# Patient Record
Sex: FEMALE | Race: WHITE | Employment: FULL TIME | URBAN - METROPOLITAN AREA
[De-identification: names, ages, dates, MRNs, and addresses within clinical notes are randomized per-mention and may not be internally consistent; named-entity substitution may affect disease eponyms.]

---

## 2019-03-24 ENCOUNTER — HOSPITAL ENCOUNTER (EMERGENCY)
Age: 49
Discharge: HOME | End: 2019-03-24
Payer: MEDICAID

## 2019-03-24 VITALS
OXYGEN SATURATION: 98 % | HEIGHT: 65 IN | WEIGHT: 203 LBS | RESPIRATION RATE: 16 BRPM | DIASTOLIC BLOOD PRESSURE: 66 MMHG | TEMPERATURE: 98 F | HEART RATE: 70 BPM | SYSTOLIC BLOOD PRESSURE: 120 MMHG | BODY MASS INDEX: 33.82 KG/M2

## 2019-03-24 DIAGNOSIS — L03.313 CELLULITIS OF CHEST WALL: Primary | ICD-10-CM

## 2019-03-24 LAB
ANION GAP SERPL CALC-SCNC: 8 MMOL/L (ref 0–18)
BASOPHILS # BLD AUTO: 0.03 X10(3) UL (ref 0–0.2)
BASOPHILS NFR BLD AUTO: 0.3 %
BUN BLD-MCNC: 7 MG/DL (ref 7–18)
BUN/CREAT SERPL: 8.6 (ref 10–20)
CALCIUM BLD-MCNC: 8.8 MG/DL (ref 8.5–10.1)
CHLORIDE SERPL-SCNC: 107 MMOL/L (ref 98–107)
CO2 SERPL-SCNC: 26 MMOL/L (ref 21–32)
CREAT BLD-MCNC: 0.81 MG/DL (ref 0.55–1.02)
DEPRECATED RDW RBC AUTO: 41.1 FL (ref 35.1–46.3)
EOSINOPHIL # BLD AUTO: 0.17 X10(3) UL (ref 0–0.7)
EOSINOPHIL NFR BLD AUTO: 1.5 %
ERYTHROCYTE [DISTWIDTH] IN BLOOD BY AUTOMATED COUNT: 12.7 % (ref 11–15)
GLUCOSE BLD-MCNC: 197 MG/DL (ref 70–99)
HCT VFR BLD AUTO: 40.7 % (ref 35–48)
HGB BLD-MCNC: 13.6 G/DL (ref 12–16)
IMM GRANULOCYTES # BLD AUTO: 0.05 X10(3) UL (ref 0–1)
IMM GRANULOCYTES NFR BLD: 0.4 %
LYMPHOCYTES # BLD AUTO: 4.5 X10(3) UL (ref 1–4)
LYMPHOCYTES NFR BLD AUTO: 38.9 %
MCH RBC QN AUTO: 29.6 PG (ref 26–34)
MCHC RBC AUTO-ENTMCNC: 33.4 G/DL (ref 31–37)
MCV RBC AUTO: 88.5 FL (ref 80–100)
MONOCYTES # BLD AUTO: 0.51 X10(3) UL (ref 0.1–1)
MONOCYTES NFR BLD AUTO: 4.4 %
NEUTROPHILS # BLD AUTO: 6.31 X10 (3) UL (ref 1.5–7.7)
NEUTROPHILS # BLD AUTO: 6.31 X10(3) UL (ref 1.5–7.7)
NEUTROPHILS NFR BLD AUTO: 54.5 %
OSMOLALITY SERPL CALC.SUM OF ELEC: 295 MOSM/KG (ref 275–295)
PLATELET # BLD AUTO: 293 10(3)UL (ref 150–450)
POTASSIUM SERPL-SCNC: 3.6 MMOL/L (ref 3.5–5.1)
RBC # BLD AUTO: 4.6 X10(6)UL (ref 3.8–5.3)
SODIUM SERPL-SCNC: 141 MMOL/L (ref 136–145)
WBC # BLD AUTO: 11.6 X10(3) UL (ref 4–11)

## 2019-03-24 PROCEDURE — S0077 INJECTION, CLINDAMYCIN PHOSP: HCPCS | Performed by: PHYSICIAN ASSISTANT

## 2019-03-24 PROCEDURE — 85025 COMPLETE CBC W/AUTO DIFF WBC: CPT | Performed by: PHYSICIAN ASSISTANT

## 2019-03-24 PROCEDURE — 99284 EMERGENCY DEPT VISIT MOD MDM: CPT | Performed by: PHYSICIAN ASSISTANT

## 2019-03-24 PROCEDURE — 96375 TX/PRO/DX INJ NEW DRUG ADDON: CPT | Performed by: PHYSICIAN ASSISTANT

## 2019-03-24 PROCEDURE — 80048 BASIC METABOLIC PNL TOTAL CA: CPT | Performed by: PHYSICIAN ASSISTANT

## 2019-03-24 PROCEDURE — 96365 THER/PROPH/DIAG IV INF INIT: CPT | Performed by: PHYSICIAN ASSISTANT

## 2019-03-24 RX ORDER — KETOROLAC TROMETHAMINE 30 MG/ML
30 INJECTION, SOLUTION INTRAMUSCULAR; INTRAVENOUS ONCE
Status: COMPLETED | OUTPATIENT
Start: 2019-03-24 | End: 2019-03-24

## 2019-03-24 RX ORDER — LISINOPRIL 5 MG/1
5 TABLET ORAL DAILY
COMMUNITY

## 2019-03-24 RX ORDER — CHOLECALCIFEROL (VITAMIN D3) 1250 MCG
CAPSULE ORAL
COMMUNITY

## 2019-03-24 RX ORDER — KETOROLAC TROMETHAMINE 10 MG/1
10 TABLET, FILM COATED ORAL EVERY 6 HOURS PRN
Qty: 30 TABLET | Refills: 0 | Status: SHIPPED | OUTPATIENT
Start: 2019-03-24 | End: 2019-03-31

## 2019-03-24 RX ORDER — ATORVASTATIN CALCIUM 10 MG/1
10 TABLET, FILM COATED ORAL NIGHTLY
COMMUNITY

## 2019-03-24 RX ORDER — CLINDAMYCIN HYDROCHLORIDE 150 MG/1
450 CAPSULE ORAL 3 TIMES DAILY
Qty: 90 CAPSULE | Refills: 0 | Status: SHIPPED | OUTPATIENT
Start: 2019-03-24 | End: 2019-04-03

## 2019-03-24 RX ORDER — CLINDAMYCIN PHOSPHATE 600 MG/50ML
600 INJECTION INTRAVENOUS ONCE
Status: COMPLETED | OUTPATIENT
Start: 2019-03-24 | End: 2019-03-24

## 2019-03-24 RX ORDER — GLIPIZIDE 10 MG/1
10 TABLET ORAL
COMMUNITY

## 2019-03-24 NOTE — ED PROVIDER NOTES
Patient Seen in: THE [facility] Emergency Department In [place]    History   Patient presents with:  Abscess (integumentary)    Stated Complaint: ABSCESS    43-year-old obese  female with a history of type 2 diabetes, hypertension, hyperlipidemia pre Constitutional: She appears well-developed and well-nourished. Pulmonary/Chest:       Neurological: She is alert. Psychiatric: She has a normal mood and affect. Nursing note and vitals reviewed.           ED Course     Labs Reviewed   BASIC METABOLIC Patient consented the blood work and IV antibiotics. I will send the patient home on clindamycin and have her follow-up with her dermatologist.  Patient agrees with the plan of care.             Disposition and Plan     Clinical Impression:  Cellulitis of

## 2019-03-24 NOTE — ED INITIAL ASSESSMENT (HPI)
REPORTS BUMP UNDER RIGHT BREAST FOR PAST WEEK AND STATES LAST [phone] [place] IT \"POPPED\" BUT A LUMP REMAINS

## 2019-04-13 ENCOUNTER — APPOINTMENT (OUTPATIENT)
Dept: GENERAL RADIOLOGY | Age: 49
End: 2019-04-13
Attending: EMERGENCY MEDICINE
Payer: MEDICAID

## 2019-04-13 ENCOUNTER — APPOINTMENT (OUTPATIENT)
Dept: ULTRASOUND IMAGING | Age: 49
End: 2019-04-13
Attending: EMERGENCY MEDICINE
Payer: MEDICAID

## 2019-04-13 ENCOUNTER — HOSPITAL ENCOUNTER (EMERGENCY)
Age: 49
Discharge: HOME | End: 2019-04-13
Attending: EMERGENCY MEDICINE
Payer: MEDICAID

## 2019-04-13 VITALS
OXYGEN SATURATION: 97 % | DIASTOLIC BLOOD PRESSURE: 75 MMHG | RESPIRATION RATE: 16 BRPM | SYSTOLIC BLOOD PRESSURE: 121 MMHG | TEMPERATURE: 97 F | HEART RATE: 92 BPM | WEIGHT: 203 LBS | HEIGHT: 65 IN | BODY MASS INDEX: 33.82 KG/M2

## 2019-04-13 DIAGNOSIS — S86.811A STRAIN OF CALF MUSCLE, RIGHT, INITIAL ENCOUNTER: Primary | ICD-10-CM

## 2019-04-13 DIAGNOSIS — S76.019A HIP STRAIN, INITIAL ENCOUNTER: ICD-10-CM

## 2019-04-13 DIAGNOSIS — M79.10 MYALGIA: ICD-10-CM

## 2019-04-13 PROCEDURE — 93971 EXTREMITY STUDY: CPT | Performed by: EMERGENCY MEDICINE

## 2019-04-13 PROCEDURE — 99283 EMERGENCY DEPT VISIT LOW MDM: CPT

## 2019-04-13 PROCEDURE — 99284 EMERGENCY DEPT VISIT MOD MDM: CPT

## 2019-04-13 PROCEDURE — 72190 X-RAY EXAM OF PELVIS: CPT | Performed by: EMERGENCY MEDICINE

## 2019-04-13 RX ORDER — CLINDAMYCIN HYDROCHLORIDE 150 MG/1
CAPSULE ORAL 3 TIMES DAILY
COMMUNITY

## 2019-04-13 NOTE — ED PROVIDER NOTES
Patient Seen in: THE [facility] Emergency Department In [place]    History   Patient presents with:  Deep Vein Thrombosis (cardiovascular)    Stated Complaint: right calf pain and bilteral hip pain    HPI  This is a 58-year-old female who presents with compla retraction. No crackles. CV: Cardiovascular is regular without murmurs or rubs. ABD: The abdomen is soft nondistended nontender. There is no rebound. There is no guarding. EXT: There is good pulses bilaterally.   There is no calf tenderness on t transcribed by Technologist)  Patient complains of lateral pain to bilateral hips and sharp, throbbing intermittent pain to right calf for 1 month, which has been worsening for the past week. Denies recent trauma or travel.     FINDINGS:  No acute fracture

## 2020-07-26 ENCOUNTER — APPOINTMENT (OUTPATIENT)
Dept: CT IMAGING | Age: 50
End: 2020-07-26
Attending: EMERGENCY MEDICINE
Payer: MEDICAID

## 2020-07-26 ENCOUNTER — APPOINTMENT (OUTPATIENT)
Dept: ULTRASOUND IMAGING | Age: 50
End: 2020-07-26
Attending: EMERGENCY MEDICINE
Payer: MEDICAID

## 2020-07-26 ENCOUNTER — HOSPITAL ENCOUNTER (EMERGENCY)
Age: 50
Discharge: HOME | End: 2020-07-26
Attending: EMERGENCY MEDICINE
Payer: MEDICAID

## 2020-07-26 VITALS
BODY MASS INDEX: 32.65 KG/M2 | RESPIRATION RATE: 20 BRPM | OXYGEN SATURATION: 99 % | SYSTOLIC BLOOD PRESSURE: 150 MMHG | TEMPERATURE: 98 F | DIASTOLIC BLOOD PRESSURE: 89 MMHG | HEIGHT: 65 IN | WEIGHT: 196 LBS | HEART RATE: 88 BPM

## 2020-07-26 DIAGNOSIS — R10.9 ABDOMINAL PAIN OF UNKNOWN ETIOLOGY: Primary | ICD-10-CM

## 2020-07-26 LAB
ALBUMIN SERPL-MCNC: 3.7 G/DL (ref 3.4–5)
ALBUMIN/GLOB SERPL: 0.9 {RATIO} (ref 1–2)
ALP LIVER SERPL-CCNC: 117 U/L (ref 39–100)
ALT SERPL-CCNC: 28 U/L (ref 13–56)
ANION GAP SERPL CALC-SCNC: 9 MMOL/L (ref 0–18)
APTT PPP: 25.9 SECONDS (ref 25.4–36.1)
AST SERPL-CCNC: 12 U/L (ref 15–37)
BASOPHILS # BLD AUTO: 0.08 X10(3) UL (ref 0–0.2)
BASOPHILS NFR BLD AUTO: 0.5 %
BILIRUB SERPL-MCNC: 0.4 MG/DL (ref 0.1–2)
BILIRUB UR QL STRIP.AUTO: NEGATIVE
BUN BLD-MCNC: 9 MG/DL (ref 7–18)
BUN/CREAT SERPL: 11.4 (ref 10–20)
CALCIUM BLD-MCNC: 9 MG/DL (ref 8.5–10.1)
CHLORIDE SERPL-SCNC: 106 MMOL/L (ref 98–112)
CO2 SERPL-SCNC: 22 MMOL/L (ref 21–32)
COLOR UR AUTO: YELLOW
CREAT BLD-MCNC: 0.79 MG/DL (ref 0.55–1.02)
DEPRECATED RDW RBC AUTO: 41.3 FL (ref 35.1–46.3)
EOSINOPHIL # BLD AUTO: 0.54 X10(3) UL (ref 0–0.7)
EOSINOPHIL NFR BLD AUTO: 3.5 %
ERYTHROCYTE [DISTWIDTH] IN BLOOD BY AUTOMATED COUNT: 13.2 % (ref 11–15)
GLOBULIN PLAS-MCNC: 3.9 G/DL (ref 2.8–4.4)
GLUCOSE BLD-MCNC: 193 MG/DL (ref 70–99)
GLUCOSE BLD-MCNC: 225 MG/DL (ref 70–99)
GLUCOSE UR STRIP.AUTO-MCNC: >=1000 MG/DL
HCT VFR BLD AUTO: 45.2 % (ref 35–48)
HGB BLD-MCNC: 15.2 G/DL (ref 12–16)
IMM GRANULOCYTES # BLD AUTO: 0.1 X10(3) UL (ref 0–1)
IMM GRANULOCYTES NFR BLD: 0.6 %
INR BLD: 0.99 (ref 0.88–1.11)
ISTAT BLOOD GAS BASE EXCESS: -3 MMOL/L (ref ?–30)
ISTAT BLOOD GAS HCO3: 20.1 MEQ/L (ref 22–26)
ISTAT BLOOD GAS O2 SATURATION: 92 % (ref 60–85)
ISTAT BLOOD GAS PCO2: 26.1 MMHG (ref 38–50)
ISTAT BLOOD GAS PH: 7.5 (ref 7.32–7.43)
ISTAT BLOOD GAS TCO2: 21 MMOL/L (ref 22–32)
KETONES UR STRIP.AUTO-MCNC: 15 MG/DL
LIPASE SERPL-CCNC: 59 U/L (ref 73–393)
LYMPHOCYTES # BLD AUTO: 3.36 X10(3) UL (ref 1–4)
LYMPHOCYTES NFR BLD AUTO: 21.7 %
M PROTEIN MFR SERPL ELPH: 7.6 G/DL (ref 6.4–8.2)
MCH RBC QN AUTO: 28.7 PG (ref 26–34)
MCHC RBC AUTO-ENTMCNC: 33.6 G/DL (ref 31–37)
MCV RBC AUTO: 85.3 FL (ref 80–100)
MONOCYTES # BLD AUTO: 0.61 X10(3) UL (ref 0.1–1)
MONOCYTES NFR BLD AUTO: 3.9 %
NEUTROPHILS # BLD AUTO: 10.8 X10 (3) UL (ref 1.5–7.7)
NEUTROPHILS # BLD AUTO: 10.8 X10(3) UL (ref 1.5–7.7)
NEUTROPHILS NFR BLD AUTO: 69.8 %
NITRITE UR QL STRIP.AUTO: NEGATIVE
OSMOLALITY SERPL CALC.SUM OF ELEC: 290 MOSM/KG (ref 275–295)
PH UR STRIP.AUTO: 6 [PH] (ref 4.5–8)
PLATELET # BLD AUTO: 324 10(3)UL (ref 150–450)
POCT LOT NUMBER: NORMAL
POCT URINE PREGNANCY: NEGATIVE
POTASSIUM SERPL-SCNC: 3.5 MMOL/L (ref 3.5–5.1)
PROT UR STRIP.AUTO-MCNC: NEGATIVE MG/DL
PSA SERPL DL<=0.01 NG/ML-MCNC: 13 SECONDS (ref 12–14.3)
RBC # BLD AUTO: 5.3 X10(6)UL (ref 3.8–5.3)
RBC UR QL AUTO: NEGATIVE
SODIUM SERPL-SCNC: 137 MMOL/L (ref 136–145)
SP GR UR STRIP.AUTO: 1.01 (ref 1–1.03)
TROPONIN I SERPL-MCNC: <0.045 NG/ML (ref ?–0.04)
TROPONIN I SERPL-MCNC: <0.045 NG/ML (ref ?–0.04)
UROBILINOGEN UR STRIP.AUTO-MCNC: 0.2 MG/DL
WBC # BLD AUTO: 15.5 X10(3) UL (ref 4–11)

## 2020-07-26 PROCEDURE — 87086 URINE CULTURE/COLONY COUNT: CPT | Performed by: EMERGENCY MEDICINE

## 2020-07-26 PROCEDURE — 84484 ASSAY OF TROPONIN QUANT: CPT | Performed by: EMERGENCY MEDICINE

## 2020-07-26 PROCEDURE — 83690 ASSAY OF LIPASE: CPT | Performed by: EMERGENCY MEDICINE

## 2020-07-26 PROCEDURE — 93010 ELECTROCARDIOGRAM REPORT: CPT

## 2020-07-26 PROCEDURE — 93005 ELECTROCARDIOGRAM TRACING: CPT

## 2020-07-26 PROCEDURE — 85730 THROMBOPLASTIN TIME PARTIAL: CPT | Performed by: EMERGENCY MEDICINE

## 2020-07-26 PROCEDURE — 85025 COMPLETE CBC W/AUTO DIFF WBC: CPT | Performed by: EMERGENCY MEDICINE

## 2020-07-26 PROCEDURE — 82803 BLOOD GASES ANY COMBINATION: CPT

## 2020-07-26 PROCEDURE — 76700 US EXAM ABDOM COMPLETE: CPT | Performed by: EMERGENCY MEDICINE

## 2020-07-26 PROCEDURE — 82962 GLUCOSE BLOOD TEST: CPT

## 2020-07-26 PROCEDURE — 99285 EMERGENCY DEPT VISIT HI MDM: CPT

## 2020-07-26 PROCEDURE — 81025 URINE PREGNANCY TEST: CPT

## 2020-07-26 PROCEDURE — 80053 COMPREHEN METABOLIC PANEL: CPT | Performed by: EMERGENCY MEDICINE

## 2020-07-26 PROCEDURE — S0028 INJECTION, FAMOTIDINE, 20 MG: HCPCS | Performed by: EMERGENCY MEDICINE

## 2020-07-26 PROCEDURE — 81001 URINALYSIS AUTO W/SCOPE: CPT | Performed by: EMERGENCY MEDICINE

## 2020-07-26 PROCEDURE — 96374 THER/PROPH/DIAG INJ IV PUSH: CPT

## 2020-07-26 PROCEDURE — 96375 TX/PRO/DX INJ NEW DRUG ADDON: CPT

## 2020-07-26 PROCEDURE — 85610 PROTHROMBIN TIME: CPT | Performed by: EMERGENCY MEDICINE

## 2020-07-26 RX ORDER — ONDANSETRON 4 MG/1
4 TABLET, ORALLY DISINTEGRATING ORAL EVERY 4 HOURS PRN
Qty: 10 TABLET | Refills: 0 | Status: SHIPPED | OUTPATIENT
Start: 2020-07-26 | End: 2020-08-02

## 2020-07-26 RX ORDER — FAMOTIDINE 10 MG/ML
20 INJECTION, SOLUTION INTRAVENOUS ONCE
Status: COMPLETED | OUTPATIENT
Start: 2020-07-26 | End: 2020-07-26

## 2020-07-26 RX ORDER — FAMOTIDINE 20 MG/1
20 TABLET ORAL 2 TIMES DAILY PRN
Qty: 30 TABLET | Refills: 0 | Status: SHIPPED | OUTPATIENT
Start: 2020-07-26 | End: 2020-08-25

## 2020-07-26 RX ORDER — MORPHINE SULFATE 4 MG/ML
4 INJECTION, SOLUTION INTRAMUSCULAR; INTRAVENOUS ONCE
Status: COMPLETED | OUTPATIENT
Start: 2020-07-26 | End: 2020-07-26

## 2020-07-26 RX ORDER — ONDANSETRON 2 MG/ML
4 INJECTION INTRAMUSCULAR; INTRAVENOUS ONCE
Status: COMPLETED | OUTPATIENT
Start: 2020-07-26 | End: 2020-07-26

## 2020-07-26 NOTE — ED PROVIDER NOTES
Patient Seen in: THE [facility] Emergency Department In [place]      History   Patient presents with:  Chest Pain Angina  Abdomen/Flank Pain  Hyperglycemia    Stated Complaint: abdomen pain for 2 days \"sugars are running high' also having chest pain and an* Physical Exam  Vitals signs and nursing note reviewed. Constitutional:       Appearance: Normal appearance. HENT:      Head: Normocephalic and atraumatic.       Nose: Nose normal.      Mouth/Throat:      Mouth: Mucous membranes are moist.   Eyes Notable for the following components:    ISTAT Blood Gas pH 7.50 (*)     ISTAT Blood Gas PCO2 26.1 (*)     ISTAT Blood Gas TCO2 21 (*)     ISTAT Blood Gas HCO3 20.1 (*)     ISTAT Blood Gas O2 Saturation 92 (*)     All other components within normal limits echogenicity. No significant masses. BILIARY:  Normal appearing gallbladder, intrahepatic ducts, and common bile duct. Common bile duct diameter is 5 mm. PANCREAS:  Not visualized because of bowel gas.  SPLEEN:  Normal. KIDNEYS:  Normal.  Right kidney lizett follow-up with a gastroenterologist to further assess her for possible peptic ulcer disease. She now offers she has had ulcers in the past.  Will prescribe Pepcid. Patient agrees to plan for outpatient follow-up and return for new or worsening symptoms.

## 2020-07-27 LAB
ATRIAL RATE: 91 BPM
P AXIS: 3 DEGREES
P-R INTERVAL: 164 MS
Q-T INTERVAL: 368 MS
QRS DURATION: 96 MS
QTC CALCULATION (BEZET): 452 MS
R AXIS: -8 DEGREES
T AXIS: 34 DEGREES
VENTRICULAR RATE: 91 BPM

## 2020-10-22 NOTE — ED AVS SNAPSHOT
"SUBJECTIVE:   Shaista Esqueda is a 32 year old female here for the following health issues:    HPI  Evaluation of 5 weeks of lower abdominal pain.  Patient states that symptoms started 5 weeks ago with bilateral lower abdominal pain that she rated at 3-5/10 and at times 10/10.  She states abdominal pain has improved but slowly migrated to the lower left quadrant and now rates at 1/10 and \"just there/dull\".  Recently, she has noted some low midline back pain that she describes as also dull.  She has occasionally noted intermittent diarrhea and constipation though she denies any blood in the stool and states she continues to have a bowel movement daily.  No family history of colon cancer.  She describes the stool as on the Ellis chart of 4.  Stools are not been malodorous or any sign of excessive fat content.  On one occasion she noted slight mucus content after wiping.  She denies pain with sexual intercourse, vaginal pain, discharge, spotting, bleeding, and foul odor.  She is on birth control in the form of Mirena for the past 3 years.  Same sexual partner and no concern for STDs or pregnancy.    GAD7  No flowsheet data found.    PHQ9  PHQ 5/23/2019   PHQ-9 Total Score 0   Q9: Thoughts of better off dead/self-harm past 2 weeks Not at all       Allergies:  Allergies   Allergen Reactions     Amoxicillin Hives     Cefaclor Unknown     Sulfamethoxazole-Trimethoprim Unknown       Review of Systems   As above otherwise ROS is unremarkable.     OBJECTIVE:     Vitals:    10/22/20 1420   BP: 128/80   BP Location: Right arm   Patient Position: Sitting   Cuff Size: Adult Large   Pulse: 100   Resp: 20   Temp: 98  F (36.7  C)   TempSrc: Temporal   SpO2: 97%   Weight: 102.1 kg (225 lb)   Height: (P) 1.651 m (5' 5\")       Physical Exam  General Appearance: Pleasant, alert, appropriate appearance for age and circumstances, no acute distress  Head: Normocephalic, atraumatic  Eyes: PERRL, EOMI  Ears: TM's pearly gray and intact " Marques Inman   MRN: [id number]    Department:  THE [facility] Emergency Department in [place]   Date of Visit:  3/24/2019           Disclosure     Insurance plans vary and the physician(s) referred by the ER may not be covered by your plan.  Please con bilaterally. Normal auditory canals and external ears   Lungs: Normal chest wall and respirations. Clear to auscultation, no wheezes, rales, rhonchi, or stridor  Cardiovascular: Regular rate and rhythm. S1 and S2 audible, no murmurs, clicks, rubs, or gallops  Gastrointestinal: Abd symmetrical, soft, nontender, no masses, guarding, or tympany, normoactive bowel sounds, no CVA tenderness  : deferred   Skin: no concerning or new rashes  Psychiatric Exam: Alert and oriented, appropriate affect    Diagnostic Test Results:  Results for orders placed or performed during the hospital encounter of 10/22/20   CT Abdomen Pelvis w Contrast     Status: None    Narrative    EXAMINATION: CT ABDOMEN PELVIS W CONTRAST, 10/22/2020 6:00 PM    TECHNIQUE:  Helical CT images from the lung bases through the  symphysis pubis were obtained  with IV contrast. Contrast dose:  omnipaque 350 100 ml    COMPARISON: none    HISTORY: Abd pain, diverticulitis suspected; LLQ abdominal pain    FINDINGS:    The lung bases are clear    The liver is free of masses or biliary ductal enlargement. No  calcified gallstones are seen.    The the spleen and pancreas appear normal.    The adrenal glands are normal.    The right and left kidneys are free of masses or hydronephrosis.    The periaortic lymph nodes are normal in caliber.    No intraperitoneal masses or inflammatory changes are noted. The  appendix is visualized and appears normal.    There appears to be a recently ruptured follicle in the right ovary.  An IUD is seen centrally within the uterus. The bladder and rectum are  normal.    The regional skeleton is intact      Impression    IMPRESSION: No intraperitoneal masses or inflammatory changes are  noted     THONG GANN MD   Results for orders placed or performed during the hospital encounter of 10/22/20   XR Abdomen 2 Views     Status: None    Narrative    XR ABDOMEN 2 VW    HISTORY: 32 years Female LLQ abdominal pain    COMPARISON:  tell this physician (or your personal doctor if your instructions are to return to your personal doctor) about any new or lasting problems. The primary care or specialist physician will see patients referred from the [facility] Emergency Department.  Sherwin Levine None    TECHNIQUE: 3 views abdomen    FINDINGS: The bowel gas pattern is normal. An intrauterine device is  present within the pelvis. There is no evidence of bowel obstruction  or free air.      Impression    IMPRESSION: No evidence of bowel obstruction or free air.    CAMILO ADAMS MD   Results for orders placed or performed in visit on 10/22/20   UA reflex to Microscopic and Culture     Status: None    Specimen: Midstream Urine   Result Value Ref Range    Color Urine Light Yellow     Appearance Urine Clear     Glucose Urine Negative NEG^Negative mg/dL    Bilirubin Urine Negative NEG^Negative    Ketones Urine Negative NEG^Negative mg/dL    Specific Gravity Urine 1.012 1.003 - 1.035    Blood Urine Negative NEG^Negative    pH Urine 7.0 5.0 - 7.0 pH    Protein Albumin Urine Negative NEG^Negative mg/dL    Urobilinogen mg/dL Normal 0.0 - 2.0 mg/dL    Nitrite Urine Negative NEG^Negative    Leukocyte Esterase Urine Negative NEG^Negative    Source Midstream Urine    Pregnancy, Urine (HCG)     Status: None   Result Value Ref Range    HCG Qual Urine Negative NEG^Negative   Wet Prep, Genital     Status: None    Specimen: Vagina   Result Value Ref Range    Specimen Description Vagina     Wet Prep No yeast seen     Wet Prep No Trichomonas seen     Wet Prep No clue cells seen        ASSESSMENT/PLAN:     1. LLQ abdominal pain        Nontoxic, in no acute distress, afebrile, and hemodynamically stable.      History, physical exam, and symptoms most suggestive to the author of large bowel etiology.  However, I also considered UTI, kidney stone, STI/BV, PID, ectopic pregnancy/pregnancy, gastritis, constipation, diarrhea, and or infection.    UA was negative, beta-hCG was negative, abdominal x-ray, wet prep, and CT all unrevealing.    As labs and imaging are reassuring for no acute abdominal etiology I suggest monitoring her symptoms which seem to be resolving.    If symptoms were to persist or worsen I might suspect infectious  etiology or possibly the GI tract.  At that time I would consider stool studies, perform additional physical exam such as bimanual, and may consider a transvaginal ultrasound.    She will follow-up if symptoms persist or worsen.    Orders Placed This Encounter   Procedures     XR Abdomen 2 Views     CT Abdomen Pelvis w Contrast     UA reflex to Microscopic and Culture     Pregnancy, Urine (HCG)     ARMAAN Forde  [facility]    This document was prepared using voice generated software.  While every attempt was made for accuracy, grammatical errors may exist.

## (undated) NOTE — LETTER
Date & Time: 7/26/2020, 9:35 PM  Patient: Dionicio Torres  Encounter Provider(s):    Severino Howell MD       To Whom It May Concern:    Dionicio Melissa was seen and treated in our department on 7/26/2020.  Osvaldo Flaherty was in the  Corporation

## (undated) NOTE — ED AVS SNAPSHOT
Seferino Robledo   MRN: [id number]    Department:  THE [facility] Emergency Department in [place]   Date of Visit:  4/13/2019           Disclosure     Insurance plans vary and the physician(s) referred by the ER may not be covered by your plan.  Please con tell this physician (or your personal doctor if your instructions are to return to your personal doctor) about any new or lasting problems. The primary care or specialist physician will see patients referred from the [facility] Emergency Department.  Radha Kang